# Patient Record
Sex: FEMALE | Race: BLACK OR AFRICAN AMERICAN | Employment: FULL TIME | URBAN - METROPOLITAN AREA
[De-identification: names, ages, dates, MRNs, and addresses within clinical notes are randomized per-mention and may not be internally consistent; named-entity substitution may affect disease eponyms.]

---

## 2017-04-14 ENCOUNTER — MYC REFILL (OUTPATIENT)
Dept: NEUROLOGY | Facility: CLINIC | Age: 22
End: 2017-04-14

## 2017-04-14 RX ORDER — FOLIC ACID 1 MG/1
1 TABLET ORAL 2 TIMES DAILY
Qty: 60 TABLET | Refills: 3 | OUTPATIENT
Start: 2017-04-14

## 2017-04-14 RX ORDER — LEVETIRACETAM 1000 MG/1
TABLET ORAL
Qty: 60 TABLET | Refills: 3 | OUTPATIENT
Start: 2017-04-14

## 2017-04-14 NOTE — TELEPHONE ENCOUNTER
Message from [employer]:  Original authorizing provider: MD Flori Gomezlidia Manjeet would like a refill of the following medications:  folic acid (FOLVITE) 1 MG tablet [Christopher Abarca MD]  levETIRAcetam 1000 MG TABS [Christopher Abarca MD]    Preferred pharmacy: [id number] - [place], [place] - [address] AT [facility]    Comment:

## 2017-04-14 NOTE — TELEPHONE ENCOUNTER
Patient has not been seen by this provider or others in the same practice since 4/1/2014 (over 3 years).  Refills denied as patient is no longer under this provider care

## 2017-06-07 ENCOUNTER — COMMUNICATION - HEALTHEAST (OUTPATIENT)
Dept: SCHEDULING | Facility: CLINIC | Age: 22
End: 2017-06-07

## 2017-11-16 NOTE — TELEPHONE ENCOUNTER
APPT INFO    Date /Time: 11/21/17, 8:30am    Reason for Appt: Seizures    Ref Provider/Clinic: Self    Are there internal records? If yes, list: [facility]   Patient Contact (Y/N) & Call Details: No, referred    Action:      OUTSIDE RECORDS CHECKLIST     CLINIC NAME COMMENTS REC (x) IMG (x)

## 2017-11-21 ENCOUNTER — PRE VISIT (OUTPATIENT)
Dept: NEUROLOGY | Facility: CLINIC | Age: 22
End: 2017-11-21

## 2017-11-21 ENCOUNTER — OFFICE VISIT (OUTPATIENT)
Dept: NEUROLOGY | Facility: CLINIC | Age: 22
End: 2017-11-21

## 2017-11-21 VITALS — HEART RATE: 64 BPM | DIASTOLIC BLOOD PRESSURE: 70 MMHG | SYSTOLIC BLOOD PRESSURE: 122 MMHG | HEIGHT: 66 IN

## 2017-11-21 DIAGNOSIS — F40.240 CLAUSTROPHOBIA: ICD-10-CM

## 2017-11-21 DIAGNOSIS — G40.319 INTRACTABLE IDIOPATHIC GENERALIZED EPILEPSY (H): Primary | ICD-10-CM

## 2017-11-21 DIAGNOSIS — R41.3 MEMORY LOSS: ICD-10-CM

## 2017-11-21 RX ORDER — DIAZEPAM 5 MG
TABLET ORAL
Qty: 1 TABLET | Refills: 0 | Status: SHIPPED | OUTPATIENT
Start: 2017-11-21

## 2017-11-21 ASSESSMENT — ENCOUNTER SYMPTOMS
NIGHT SWEATS: 1
LEG PAIN: 1
WEIGHT LOSS: 0
DYSPNEA ON EXERTION: 1
EXERCISE INTOLERANCE: 1
INSOMNIA: 1
TINGLING: 0
DIZZINESS: 1
NERVOUS/ANXIOUS: 1
SPEECH CHANGE: 1
COUGH: 1
WHEEZING: 1
DECREASED LIBIDO: 0
EYE WATERING: 1
POLYDIPSIA: 0
PANIC: 1
DECREASED CONCENTRATION: 1
HYPOTENSION: 0
HOT FLASHES: 0
HYPERTENSION: 0
ALTERED TEMPERATURE REGULATION: 1
TREMORS: 0
DOUBLE VISION: 0
LOSS OF CONSCIOUSNESS: 1
HEMOPTYSIS: 0
SHORTNESS OF BREATH: 1
ORTHOPNEA: 1
DEPRESSION: 1
PARALYSIS: 0
DECREASED APPETITE: 1
HEADACHES: 1
COUGH DISTURBING SLEEP: 1
SWOLLEN GLANDS: 0
CHILLS: 0
NUMBNESS: 0
HALLUCINATIONS: 1
SLEEP DISTURBANCES DUE TO BREATHING: 1
SYNCOPE: 1
PALPITATIONS: 0
SPUTUM PRODUCTION: 1
FEVER: 0
EYE PAIN: 0
SNORES LOUDLY: 1
MEMORY LOSS: 1
EYE REDNESS: 0
WEAKNESS: 1
INCREASED ENERGY: 1
BRUISES/BLEEDS EASILY: 0
SEIZURES: 1
POSTURAL DYSPNEA: 1
WEIGHT GAIN: 1
FATIGUE: 0
LIGHT-HEADEDNESS: 1
POLYPHAGIA: 0
EYE IRRITATION: 1

## 2017-11-21 ASSESSMENT — PAIN SCALES - GENERAL: PAINLEVEL: NO PAIN (0)
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
## 2017-11-21 NOTE — MR AVS SNAPSHOT
After Visit Summary   11/21/2017    Amrita Burroughs    MRN: [id number]           Patient Information     Date Of Birth          1995        Visit Information        Provider Department      11/21/2017 8:30 AM Chad Cox MD [facility] Neurology        Today's Diagnoses     Intractable idiopathic generalized epilepsy (H)    -  1    Memory loss        Claustrophobia           Follow-ups after your visit        Follow-up notes from your care team     Return in about 3 months (around 2/21/2018).      Your next 10 appointments already scheduled     Jan 16, 2018 12:00 PM CST   MR BRAIN W/O & W CONTRAST with [id number]   [facility]    [address] [address]   [phone]           Take your medicines as usual, unless your doctor tells you not to. Bring a list of your current medicines to your exam (including vitamins, minerals and over-the-counter drugs).  You will be given intravenous contrast for this exam. To prepare:   The day before your exam, drink extra fluids at least six 8-ounce glasses (unless your doctor tells you to restrict your fluids).   Have a blood test (creatinine test) within 30 days of your exam. Go to your clinic or Diagnostic Imaging Department for this test.  The MRI machine uses a strong magnet. Please wear clothes without metal (snaps, zippers). A sweatsuit works well, or we may give you a hospital gown.  Please remove any body piercings and hair extensions before you arrive. You will also remove watches, jewelry, hairpins, wallets, dentures, partial dental plates and hearing aids. You may wear contact lenses, and you may be able to wear your rings. We have a safe place to keep your personal items, but it is safer to leave them at home.   **IMPORTANT** THE INSTRUCTIONS BELOW ARE ONLY FOR THOSE PATIENTS WHO HAVE BEEN TOLD THEY WILL RECEIVE SEDATION OR GENERAL ANESTHESIA DURING THEIR MRI PROCEDURE:  IF YOU  WILL RECEIVE SEDATION (take medicine to help you relax during your exam):   You must get the medicine from your doctor before you arrive. Bring the medicine to the exam. Do not take it at home.   Arrive one hour early. Bring someone who can take you home after the test. Your medicine will make you sleepy. After the exam, you may not drive, take a bus or take a taxi by yourself.   No eating 8 hours before your exam. You may have clear liquids up until 4 hours before your exam. (Clear liquids include water, clear tea, black coffee and fruit juice without pulp.)  IF YOU WILL RECEIVE ANESTHESIA (be asleep for your exam):   Arrive 1 1/2 hours early. Bring someone who can take you home after the test. You may not drive, take a bus or take a taxi by yourself.   No eating 8 hours before your exam. You may have clear liquids up until 4 hours before your exam. (Clear liquids include water, clear tea, black coffee and fruit juice without pulp.)  Please call the Imaging Department at your exam site with any questions.            Jan 24, 2018  8:30 AM CST   EEG with [facility] EEG 2   [facility] ([facility])    [address]  [facility] [address]   [phone]            Jan 24, 2018 12:30 PM CST   New Patient Visit with Librado Mcdonough, PhD [facility] ([facility])    [address]  [facility] [address]   [phone]            Feb 20, 2018  3:30 PM CST   (Arrive by 3:15 PM)   Return Seizure with Chad Cox MD   [facility] Neurology ([facility])    [address]  [address]rd [address]  [place] [facility] [id number] [phone]              Future tests that were ordered for you today     Open Future Orders        Priority Expected Expires Ordered    Neuropsychological testing Routine 11/22/2017 11/21/2018 11/21/2017    EEG video monitoring Routine 11/22/2017 11/21/2018 11/21/2017    MRI Brain w & w/o contrast  "Routine 11/22/2017 11/21/2018 11/21/2017            Who to contact     Please call your clinic at [phone] to:    Ask questions about your health    Make or cancel appointments    Discuss your medicines    Learn about your test results    Speak to your doctor   If you have compliments or concerns about an experience at your clinic, or if you wish to file a complaint, please contact [facility] Patient Relations at [phone] or email us at [email or web address]         Additional Information About Your Visit        [employer] Yourself Information     [employer]a gives you secure access to your electronic health record. If you see a primary care provider, you can also send messages to your care team and make appointments. If you have questions, please call your primary care clinic.  If you do not have a primary care provider, please call [phone] and they will assist you.      Y[employer]a is an electronic gateway that provides easy, online access to your medical records. With [employer], you can request a clinic appointment, read your test results, renew a prescription or communicate with your care team.     To access your existing account, please contact your [facility] or call [phone] for assistance.        Care EveryWhere ID     This is your Care EveryWhere ID. This could be used by other organizations to access your [place] medical records  [id number]        Your Vitals Were     Pulse Height                64 1.676 m (5' 6\")           Blood Pressure from Last 3 Encounters:   11/21/17 122/70   01/08/16 133/78   04/01/14 108/63    Weight from Last 3 Encounters:   01/08/16 114.7 kg (252 lb 12.8 oz)   04/01/14 105.7 kg (233 lb)   03/17/14 105.2 kg (232 lb)                 Today's Medication Changes          These changes are accurate as of: 11/21/17  9:58 AM.  If you have any questions, ask your nurse or doctor.               Start taking these medicines.  "       Dose/Directions    diazepam 5 MG tablet   Commonly known as:  VALIUM   Used for:  Claustrophobia   Started by:  Chad Cox MD        Take 1 tablet (5 mg) about 30 minutes before MRI scan.   Quantity:  1 tablet   Refills:  0            Where to get your medicines      Some of these will need a paper prescription and others can be bought over the counter.  Ask your nurse if you have questions.     Bring a paper prescription for each of these medications     diazepam 5 MG tablet                Primary Care Provider Office Phone # Fax #    [facility] [phone] [address] [address]        Equal Access to Services     [facility]: Hadii héctor estevez hadasho Sobayron, waaxda luqadaha, qaybta kaalmada adeboris, arti castelan . So [facility] [phone].    ATENCIÓN: Si habla español, tiene a [place] disposición servicios gratuitos de asistencia lingüística. [facility] [phone].    We comply with applicable federal civil rights laws and [place] laws. We do not discriminate on the basis of race, color, national origin, age, disability, sex, sexual orientation, or gender identity.            Thank you!     Thank you for choosing [facility] NEUROLOGY  for your care. Our goal is always to provide you with excellent care. Hearing back from our patients is one way we can continue to improve our services. Please take a few minutes to complete the written survey that you may receive in the mail after your visit with us. Thank you!             Your Updated Medication List - Protect others around you: Learn how to safely use, store and throw away your medicines at www.disposemymeds.org.          This list is accurate as of: 11/21/17  9:58 AM.  Always use your most recent med list.                   Brand Name Dispense Instructions for use Diagnosis    Albuterol Sulfate 108 (90 BASE) MCG/ACT Aepb           diazepam 5 MG tablet    VALIUM    1 tablet     Take 1 tablet (5 mg) about 30 minutes before MRI scan.    Claustrophobia       LATUDA PO      Take 20 mg by mouth        medical cannabis liquid      (This is NOT a prescription, and does not certify that the patient has a qualifying medical condition for medical cannabis.  The purpose of this order is  to document that the patient reports taking medical cannabis.)        TRAZODONE HCL PO      Take 50 mg by mouth
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
## 2017-12-06 ENCOUNTER — COMMUNICATION - HEALTHEAST (OUTPATIENT)
Dept: SCHEDULING | Facility: CLINIC | Age: 22
End: 2017-12-06

## 2017-12-17 ENCOUNTER — HEALTH MAINTENANCE LETTER (OUTPATIENT)
Age: 22
End: 2017-12-17

## 2018-06-06 ENCOUNTER — COMMUNICATION - HEALTHEAST (OUTPATIENT)
Dept: SCHEDULING | Facility: CLINIC | Age: 23
End: 2018-06-06

## 2018-06-13 ENCOUNTER — OFFICE VISIT - HEALTHEAST (OUTPATIENT)
Dept: FAMILY MEDICINE | Facility: CLINIC | Age: 23
End: 2018-06-13

## 2018-06-13 ENCOUNTER — COMMUNICATION - HEALTHEAST (OUTPATIENT)
Dept: INTERNAL MEDICINE | Facility: CLINIC | Age: 23
End: 2018-06-13

## 2018-06-13 DIAGNOSIS — R07.9 CHEST PAIN: ICD-10-CM

## 2018-06-13 DIAGNOSIS — R10.11 RUQ ABDOMINAL PAIN: ICD-10-CM

## 2018-06-13 LAB
ALBUMIN SERPL-MCNC: 3.1 G/DL (ref 3.5–5)
ALBUMIN UR-MCNC: ABNORMAL MG/DL
ALP SERPL-CCNC: 88 U/L (ref 45–120)
ALT SERPL W P-5'-P-CCNC: 31 U/L (ref 0–45)
ANION GAP SERPL CALCULATED.3IONS-SCNC: 10 MMOL/L (ref 5–18)
APPEARANCE UR: CLEAR
AST SERPL W P-5'-P-CCNC: 20 U/L (ref 0–40)
BACTERIA #/AREA URNS HPF: ABNORMAL HPF
BASOPHILS # BLD AUTO: 0 THOU/UL (ref 0–0.2)
BASOPHILS NFR BLD AUTO: 1 % (ref 0–2)
BILIRUB SERPL-MCNC: 0.5 MG/DL (ref 0–1)
BILIRUB UR QL STRIP: ABNORMAL
BUN SERPL-MCNC: 10 MG/DL (ref 8–22)
CALCIUM SERPL-MCNC: 9.1 MG/DL (ref 8.5–10.5)
CHLORIDE BLD-SCNC: 110 MMOL/L (ref 98–107)
CO2 SERPL-SCNC: 20 MMOL/L (ref 22–31)
COLOR UR AUTO: YELLOW
CREAT SERPL-MCNC: 0.77 MG/DL (ref 0.6–1.1)
D DIMER PPP FEU-MCNC: 0.41 FEU UG/ML
EOSINOPHIL # BLD AUTO: 0.2 THOU/UL (ref 0–0.4)
EOSINOPHIL NFR BLD AUTO: 3 % (ref 0–6)
ERYTHROCYTE [DISTWIDTH] IN BLOOD BY AUTOMATED COUNT: 14.6 % (ref 11–14.5)
GFR SERPL CREATININE-BSD FRML MDRD: >60 ML/MIN/1.73M2
GLUCOSE BLD-MCNC: 84 MG/DL (ref 70–125)
GLUCOSE UR STRIP-MCNC: NEGATIVE MG/DL
HCT VFR BLD AUTO: 34.6 % (ref 35–47)
HGB BLD-MCNC: 11.2 G/DL (ref 12–16)
HGB UR QL STRIP: ABNORMAL
KETONES UR STRIP-MCNC: ABNORMAL MG/DL
LEUKOCYTE ESTERASE UR QL STRIP: NEGATIVE
LIPASE SERPL-CCNC: 39 U/L (ref 0–52)
LYMPHOCYTES # BLD AUTO: 2.8 THOU/UL (ref 0.8–4.4)
LYMPHOCYTES NFR BLD AUTO: 42 % (ref 20–40)
MCH RBC QN AUTO: 27 PG (ref 27–34)
MCHC RBC AUTO-ENTMCNC: 32.4 G/DL (ref 32–36)
MCV RBC AUTO: 83 FL (ref 80–100)
MONOCYTES # BLD AUTO: 0.4 THOU/UL (ref 0–0.9)
MONOCYTES NFR BLD AUTO: 6 % (ref 2–10)
MUCOUS THREADS #/AREA URNS LPF: ABNORMAL LPF
NEUTROPHILS # BLD AUTO: 3.1 THOU/UL (ref 2–7.7)
NEUTROPHILS NFR BLD AUTO: 48 % (ref 50–70)
NITRATE UR QL: NEGATIVE
PH UR STRIP: 5.5 [PH] (ref 5–8)
PLATELET # BLD AUTO: 374 THOU/UL (ref 140–440)
PMV BLD AUTO: 7.7 FL (ref 7–10)
POTASSIUM BLD-SCNC: 4.5 MMOL/L (ref 3.5–5)
PROT SERPL-MCNC: 6.5 G/DL (ref 6–8)
RBC # BLD AUTO: 4.16 MILL/UL (ref 3.8–5.4)
RBC #/AREA URNS AUTO: ABNORMAL HPF
SODIUM SERPL-SCNC: 140 MMOL/L (ref 136–145)
SP GR UR STRIP: >=1.03 (ref 1–1.03)
SQUAMOUS #/AREA URNS AUTO: ABNORMAL LPF
UROBILINOGEN UR STRIP-ACNC: ABNORMAL
WBC #/AREA URNS AUTO: ABNORMAL HPF
WBC: 6.5 THOU/UL (ref 4–11)

## 2018-06-15 ENCOUNTER — COMMUNICATION - HEALTHEAST (OUTPATIENT)
Dept: INTERNAL MEDICINE | Facility: CLINIC | Age: 23
End: 2018-06-15

## 2018-06-20 ENCOUNTER — COMMUNICATION - HEALTHEAST (OUTPATIENT)
Dept: SCHEDULING | Facility: CLINIC | Age: 23
End: 2018-06-20

## 2018-06-20 DIAGNOSIS — R51.9 HEADACHE: ICD-10-CM

## 2018-09-27 ENCOUNTER — OFFICE VISIT - HEALTHEAST (OUTPATIENT)
Dept: FAMILY MEDICINE | Facility: CLINIC | Age: 23
End: 2018-09-27

## 2018-09-27 DIAGNOSIS — G40.909 EPILEPSY (H): ICD-10-CM

## 2018-09-27 DIAGNOSIS — G43.909 MIGRAINE: ICD-10-CM

## 2018-09-27 DIAGNOSIS — Z79.899 MEDICAL MARIJUANA USE: ICD-10-CM

## 2018-10-25 ENCOUNTER — COMMUNICATION - HEALTHEAST (OUTPATIENT)
Dept: CARE COORDINATION | Facility: CLINIC | Age: 23
End: 2018-10-25

## 2018-12-11 ENCOUNTER — COMMUNICATION - HEALTHEAST (OUTPATIENT)
Dept: INTERNAL MEDICINE | Facility: CLINIC | Age: 23
End: 2018-12-11

## 2019-01-06 ENCOUNTER — HOSPITAL ENCOUNTER (EMERGENCY)
Facility: CLINIC | Age: 24
Discharge: HOME | End: 2019-01-06
Attending: EMERGENCY MEDICINE | Admitting: EMERGENCY MEDICINE
Payer: COMMERCIAL

## 2019-01-06 VITALS
SYSTOLIC BLOOD PRESSURE: 131 MMHG | RESPIRATION RATE: 18 BRPM | DIASTOLIC BLOOD PRESSURE: 68 MMHG | HEART RATE: 77 BPM | TEMPERATURE: 98.6 F | OXYGEN SATURATION: 99 %

## 2019-01-06 DIAGNOSIS — Z00.00 EVALUATION BY MEDICAL SERVICE REQUIRED: ICD-10-CM

## 2019-01-06 PROCEDURE — 99282 EMERGENCY DEPT VISIT SF MDM: CPT

## 2019-01-06 ASSESSMENT — ENCOUNTER SYMPTOMS: SEIZURES: 0

## 2019-01-06 NOTE — ED AVS SNAPSHOT
[facility] Emergency Department  [address]  [facility] [address]  Phone:  [phone]  Fax:  [phone]                                    Amrita Burroughs   MRN: [id number]    Department:  [facility] Emergency Department   Date of Visit:  1/6/2019           After Visit Summary Signature Page    I have received my discharge instructions, and my questions have been answered. I have discussed any challenges I see with this plan with the nurse or doctor.    ..........................................................................................................................................  Patient/Patient Representative Signature      ..........................................................................................................................................  Patient Representative Print Name and Relationship to Patient    ..................................................               ................................................  Date                                   Time    ..........................................................................................................................................  Reviewed by Signature/Title    ...................................................              ..............................................  Date                                               Time          22EPIC Rev 08/18

## 2019-01-06 NOTE — DISCHARGE INSTRUCTIONS
Please follow-up with your primary care doctor as needed.    Please return to the emergency department as needed for new or worsening symptoms including thoughts of self-harm or harming others, auditory or visual hallucinations.
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
## 2019-01-06 NOTE — ED AVS SNAPSHOT
[facility] EMERGENCY DEPARTMENT: [phone]                                              INTERAGENCY TRANSFER FORM - LAB / IMAGING / EKG / EMG RESULTS   2019                   Hospital Admission Date: 2019  KATHIE MCCRARY   : 1995  Sex: Female         Attending Provider:  Efraín Estrella MD    Allergies:  Toradol [Ketorolac Tromethamine]    Infection:  None   Service:  EMERGENCY ME    Ht:  --   Wt:  --   Admission Wt:  --    BMI:  --   BSA:  --            Patient PCP Information     Provider PCP Type    [facility]      Unresulted Labs (24h ago, onward)    None      Encounter-Level Documents:    There are no encounter-level documents.     Order-Level Documents:    There are no order-level documents.

## 2019-10-08 ENCOUNTER — HOSPITAL ENCOUNTER (EMERGENCY)
Facility: CLINIC | Age: 24
Discharge: HOME | End: 2019-10-08
Attending: EMERGENCY MEDICINE | Admitting: EMERGENCY MEDICINE
Payer: COMMERCIAL

## 2019-10-08 VITALS
BODY MASS INDEX: 40.18 KG/M2 | HEIGHT: 66 IN | DIASTOLIC BLOOD PRESSURE: 87 MMHG | HEART RATE: 116 BPM | WEIGHT: 250 LBS | OXYGEN SATURATION: 100 % | SYSTOLIC BLOOD PRESSURE: 128 MMHG | RESPIRATION RATE: 22 BRPM | TEMPERATURE: 97.6 F

## 2019-10-08 DIAGNOSIS — E80.6 HYPERBILIRUBINEMIA: ICD-10-CM

## 2019-10-08 DIAGNOSIS — J45.901 EXACERBATION OF ASTHMA, UNSPECIFIED ASTHMA SEVERITY, UNSPECIFIED WHETHER PERSISTENT: ICD-10-CM

## 2019-10-08 DIAGNOSIS — E87.6 HYPOKALEMIA: ICD-10-CM

## 2019-10-08 DIAGNOSIS — R11.10 VOMITING AND DIARRHEA: ICD-10-CM

## 2019-10-08 DIAGNOSIS — R19.7 VOMITING AND DIARRHEA: ICD-10-CM

## 2019-10-08 LAB
ALBUMIN SERPL-MCNC: 4 G/DL (ref 3.4–5)
ALP SERPL-CCNC: 108 U/L (ref 40–150)
ALT SERPL W P-5'-P-CCNC: 22 U/L (ref 0–50)
ANION GAP SERPL CALCULATED.3IONS-SCNC: 5 MMOL/L (ref 3–14)
AST SERPL W P-5'-P-CCNC: 17 U/L (ref 0–45)
B-HCG FREE SERPL-ACNC: <5 IU/L
BILIRUB SERPL-MCNC: 1.6 MG/DL (ref 0.2–1.3)
BUN SERPL-MCNC: 10 MG/DL (ref 7–30)
CALCIUM SERPL-MCNC: 9.3 MG/DL (ref 8.5–10.1)
CHLORIDE SERPL-SCNC: 110 MMOL/L (ref 94–109)
CO2 SERPL-SCNC: 22 MMOL/L (ref 20–32)
CREAT SERPL-MCNC: 0.79 MG/DL (ref 0.52–1.04)
GFR SERPL CREATININE-BSD FRML MDRD: >90 ML/MIN/{1.73_M2}
GLUCOSE SERPL-MCNC: 93 MG/DL (ref 70–99)
LIPASE SERPL-CCNC: 90 U/L (ref 73–393)
POTASSIUM SERPL-SCNC: 3.3 MMOL/L (ref 3.4–5.3)
PROT SERPL-MCNC: 8 G/DL (ref 6.8–8.8)
SODIUM SERPL-SCNC: 137 MMOL/L (ref 133–144)

## 2019-10-08 PROCEDURE — 80053 COMPREHEN METABOLIC PANEL: CPT | Performed by: EMERGENCY MEDICINE

## 2019-10-08 PROCEDURE — 84702 CHORIONIC GONADOTROPIN TEST: CPT

## 2019-10-08 PROCEDURE — 99284 EMERGENCY DEPT VISIT MOD MDM: CPT | Mod: 25

## 2019-10-08 PROCEDURE — 96374 THER/PROPH/DIAG INJ IV PUSH: CPT

## 2019-10-08 PROCEDURE — 94640 AIRWAY INHALATION TREATMENT: CPT

## 2019-10-08 PROCEDURE — 96361 HYDRATE IV INFUSION ADD-ON: CPT

## 2019-10-08 PROCEDURE — 83690 ASSAY OF LIPASE: CPT | Performed by: EMERGENCY MEDICINE

## 2019-10-08 PROCEDURE — 25000125 ZZHC RX 250: Performed by: EMERGENCY MEDICINE

## 2019-10-08 PROCEDURE — 25000128 H RX IP 250 OP 636: Performed by: EMERGENCY MEDICINE

## 2019-10-08 PROCEDURE — 25000131 ZZH RX MED GY IP 250 OP 636 PS 637: Performed by: EMERGENCY MEDICINE

## 2019-10-08 RX ORDER — PREDNISONE 20 MG/1
40 TABLET ORAL DAILY
Qty: 8 TABLET | Refills: 0 | Status: SHIPPED | OUTPATIENT
Start: 2019-10-08 | End: 2019-10-12

## 2019-10-08 RX ORDER — IPRATROPIUM BROMIDE AND ALBUTEROL SULFATE 2.5; .5 MG/3ML; MG/3ML
3 SOLUTION RESPIRATORY (INHALATION)
Status: COMPLETED | OUTPATIENT
Start: 2019-10-08 | End: 2019-10-08

## 2019-10-08 RX ORDER — PREDNISONE 20 MG/1
40 TABLET ORAL ONCE
Status: COMPLETED | OUTPATIENT
Start: 2019-10-08 | End: 2019-10-08

## 2019-10-08 RX ORDER — IPRATROPIUM BROMIDE AND ALBUTEROL SULFATE 2.5; .5 MG/3ML; MG/3ML
3 SOLUTION RESPIRATORY (INHALATION) ONCE
Status: COMPLETED | OUTPATIENT
Start: 2019-10-08 | End: 2019-10-08

## 2019-10-08 RX ORDER — ONDANSETRON 4 MG/1
4 TABLET, ORALLY DISINTEGRATING ORAL EVERY 6 HOURS PRN
Qty: 15 TABLET | Refills: 0 | Status: SHIPPED | OUTPATIENT
Start: 2019-10-08

## 2019-10-08 RX ORDER — ONDANSETRON 2 MG/ML
4 INJECTION INTRAMUSCULAR; INTRAVENOUS
Status: DISCONTINUED | OUTPATIENT
Start: 2019-10-08 | End: 2019-10-08 | Stop reason: HOSPADM

## 2019-10-08 RX ORDER — SODIUM CHLORIDE 9 MG/ML
1000 INJECTION, SOLUTION INTRAVENOUS CONTINUOUS
Status: DISCONTINUED | OUTPATIENT
Start: 2019-10-08 | End: 2019-10-08 | Stop reason: HOSPADM

## 2019-10-08 RX ORDER — ALBUTEROL SULFATE 90 UG/1
2 AEROSOL, METERED RESPIRATORY (INHALATION) EVERY 4 HOURS PRN
Qty: 1 INHALER | Refills: 0 | Status: SHIPPED | OUTPATIENT
Start: 2019-10-08

## 2019-10-08 RX ADMIN — IPRATROPIUM BROMIDE AND ALBUTEROL SULFATE 3 ML: .5; 3 SOLUTION RESPIRATORY (INHALATION) at 11:03

## 2019-10-08 RX ADMIN — IPRATROPIUM BROMIDE AND ALBUTEROL SULFATE 3 ML: .5; 3 SOLUTION RESPIRATORY (INHALATION) at 09:29

## 2019-10-08 RX ADMIN — IPRATROPIUM BROMIDE AND ALBUTEROL SULFATE 3 ML: .5; 3 SOLUTION RESPIRATORY (INHALATION) at 10:45

## 2019-10-08 RX ADMIN — PREDNISONE 40 MG: 20 TABLET ORAL at 09:29

## 2019-10-08 RX ADMIN — SODIUM CHLORIDE 1000 ML: 9 INJECTION, SOLUTION INTRAVENOUS at 09:38

## 2019-10-08 RX ADMIN — ONDANSETRON 4 MG: 2 INJECTION INTRAMUSCULAR; INTRAVENOUS at 09:39

## 2019-10-08 ASSESSMENT — MIFFLIN-ST. JEOR: SCORE: 1890.74

## 2019-10-08 NOTE — ED AVS SNAPSHOT
Emergency Department  [address] [place] [id number]  Phone:  [phone]  Fax:  [phone]                                    Amrita Burroughs   MRN: [id number]    Department:   Emergency Department   Date of Visit:  10/8/2019           After Visit Summary Signature Page    I have received my discharge instructions, and my questions have been answered. I have discussed any challenges I see with this plan with the nurse or doctor.    ..........................................................................................................................................  Patient/Patient Representative Signature      ..........................................................................................................................................  Patient Representative Print Name and Relationship to Patient    ..................................................               ................................................  Date                                   Time    ..........................................................................................................................................  Reviewed by Signature/Title    ...................................................              ..............................................  Date                                               Time          22EPIC Rev 08/18

## 2019-10-08 NOTE — ED PROVIDER NOTES
"  History     Chief Complaint:  Shortness of Breath    The history is provided by the patient.      Amrita Burroughs is a 24 year old female who presents with shortness of breath. The patient reports chest tightness due to wheezing and trouble breathing, dry cough, runny nose, vomiting, and diarrhea for several days. She reports some wheezing. She has a history of asthma but does not have an inhaler anymore to use. She denies smoking cigarettes but does smoke marijuana. Her last menstrual period was in September and she denies concern for pregnancy.  She has never been in the ICU or intubated for her breathing problems.    Allergies:  Methylprednisolone  Toradol     Medications:    Albuterol  Valium  Latuda  Trazodone     Past Medical History:    Memory loss  Claustrophobia  Intractable idiopathic generalized epilepsy  Asthma    Past Surgical History:    History reviewed. No pertinent past surgical history.    Family History:    History reviewed. No pertinent family history.    Social History:  Patient is single  Tobacco Use: No  Alcohol Use: No  PCP: Physician No Ref-Primary     Review of Systems   All other systems reviewed and are negative.    Physical Exam   First Vitals:  Patient Vitals for the past 24 hrs:   BP Temp Temp src Pulse Heart Rate Resp SpO2 Height Weight   10/08/19 1104 135/70 -- -- 122 -- -- 100 % -- --   10/08/19 1010 -- -- -- -- -- -- 100 % -- --   10/08/19 1000 116/69 -- -- 100 -- -- 97 % -- --   10/08/19 0949 126/69 -- -- -- 92 22 100 % -- --   10/08/19 0911 121/61 97.6  F (36.4  C) Temporal -- 103 22 100 % 1.676 m (5' 6\") 113.4 kg (250 lb)     Physical Exam  General: Woman sitting upright in room 9  HENT: mucous membranes moist  CV: tachycardic rate, regular rhythm, no lower extremity edema, no murmur audible  Resp: Initially with moderate expiratory wheezing and slightly diminished breath sounds throughout, no wheezing, fairly frequent dry cough observed, speaks in full phrases  GI: abdomen " soft and nontender, no guarding  MSK: no bony tenderness  Skin: appropriately warm and dry  Neuro: alert, clear speech, oriented  Psych: slightly anxious, cooperative      Emergency Department Course     Laboratory:  ISTAT HCG (0943): <5.0  CMP: Potassium 3.3 low, Chloride 110 high, Bilirubin 1.6 high, o/w WNL. (Creatinine 0.79)  Lipase: 90    Interventions:  0929: Deltasone 40mg PO  0929: Duoneb 3mL Nebulization  0938: NS 1L IV  0939: Zofran 4mg IV  1045: Duoneb 3mL Nebulization   1103: Duoneb 3mL Nebulization    Emergency Department Course:  9:14 AM Nursing notes and vitals reviewed.  I performed an exam of the patient as documented above.     I performed electronic chart review in EP[facility].  I reviewed available electronic records in Care Everywhere.  The patient was placed on continuous cardiac and pulse ox monitoring.    10:32 AM I rechecked on and updated the patient. She has only had one of the two nebulization treatments I ordered so far and she states it has helped some.     11:12 AM I rechecked on the patient. She is feeling improved wheezing has decreased air movement has noticeably increased.  She is not hypoxic.  After finishing her third nebulization.      11:16 AM Findings and plan explained to the patient. Patient discharged home with instructions regarding supportive care, medications, and reasons to return. The importance of close follow-up was reviewed.     Impression & Plan      Medical Decision Making:  I think she is likely experiencing a viral syndrome that has provoked an asthma exacerbation as well as her nonbloody vomiting and diarrhea.  Her abdomen is completely soft and I do not think she requires emergent imaging.  Extremely low suspicion for any surgical etiology.  Alternate diagnoses including cardiac arrhythmia, pulmonary embolism, pneumonia, pneumothorax, and pulmonary edema were considered among many others.  She was treated with bronchodilators with improvement.  She is not in  respiratory distress at the time of discharge and I think that she is appropriate for a trial of outpatient management.  I note that she did not have any bronchodilators available to her at home, and I am hopeful that with initiation of a burst of steroids as well as a prescription for albuterol MDI she will improve over the next few days.  She is tolerating oral intake here.  She was advised to follow-up through clinic in the next few days if not steadily improving, and return here in the unexpected event of acute worsening.  She also benefit from follow-up through clinic within a week for recheck of her laboratory values including hypokalemia and hyperbilirubinemia of unclear clinical significance.    Diagnosis:    ICD-10-CM    1. Exacerbation of asthma, unspecified asthma severity, unspecified whether persistent J45.901    2. Vomiting and diarrhea R11.10     R19.7    3. Hypokalemia E87.6    4. Hyperbilirubinemia E80.6        Disposition:  discharged to home    Discharge Medications:  New Prescriptions    ALBUTEROL (PROAIR HFA) 108 (90 BASE) MCG/ACT INHALER    Inhale 2 puffs into the lungs every 4 hours as needed for shortness of breath / dyspnea    ONDANSETRON (ZOFRAN ODT) 4 MG ODT TAB    Take 1 tablet (4 mg) by mouth every 6 hours as needed for nausea    PREDNISONE (DELTASONE) 20 MG TABLET    Take 2 tablets (40 mg) by mouth daily for 4 days     I, Bradley Aasen, am serving as a scribe on 10/8/2019 at 9:14 AM to personally document services performed by Davin Vilchis MD based on my observations and the provider's statements to me.     This record was created at least in part using electronic voice recognition software, so please excuse any typographical errors.         Davin Vilchis MD  10/08/19 3298

## 2019-10-08 NOTE — LETTER
October 8, 2019      To Whom It May Concern:      Amrita Burroughs was seen in our Emergency Department today, 10/08/19.  I expect her condition to improve over the next 2 days.  She may return to work when improved.    Sincerely,        KIMBERLEY Webb

## 2019-10-08 NOTE — ED TRIAGE NOTES
C/o cough for the past couple days, hx of asthma and feeling increasing sob and wheezing. Hasn't been using her nebulizer. .

## 2020-03-02 ENCOUNTER — HEALTH MAINTENANCE LETTER (OUTPATIENT)
Age: 25
End: 2020-03-02

## 2020-12-20 ENCOUNTER — HEALTH MAINTENANCE LETTER (OUTPATIENT)
Age: 25
End: 2020-12-20

## 2021-04-24 ENCOUNTER — HEALTH MAINTENANCE LETTER (OUTPATIENT)
Age: 26
End: 2021-04-24

## 2021-06-01 VITALS — BODY MASS INDEX: 40.32 KG/M2 | WEIGHT: 253.6 LBS

## 2021-06-02 VITALS — BODY MASS INDEX: 41.38 KG/M2 | WEIGHT: 256.4 LBS

## 2021-06-18 NOTE — PROGRESS NOTES
Assessment/Plan:      Amrita Burroughs is a 22 y.o. female here to establish care and for the follow-up of    1. Chest pain  Recent chest CTA to rule out for PE was reported to be suboptimal    Plan  - D-dimer, Quantitative-was negative. No need to repeat the scan   - XR Chest 2 Views  We will follow-up pending the results of the study to manage accordingly     2. RUQ abdominal pain    - Comprehensive Metabolic Panel  - HM1(CBC and Differential)  - Urinalysis-UC if Indicated  - Lipase  - US Abdomen Limited  We will follow-up pending the results of the study to manage accordingly             Subjective:    Patient ID:   Amrita Burroughs is a 22 y.o. female here to establish care.   She was recently hospitalized on and off during this past 2 weeks in [place], for food poisioning, stomach and chest pains.   Her chest symptoms continue to feel about the same with no change in severity with or without the deep breathing.  She has intermittent sharp pains running down her mid back, I has noted some wheezing.  In view of her recent CTA exam to rule out for PE, said to be a suboptimal exam and noted to have trace pericardial and right pleural effusions.  She knows to be having pain in the right upper quadrant and a daily headache which has not been tolerating Tylenol since the ibuprofen is been causing her to have stomach irritation.  She also states that has stopped taking the Prilosec  .      Review of Systems  Constitutional: negative  Eyes: negative  ENT and face: negative  Respiratory: See HPI, chest pains and some shortness of breath, wheezing noted  CV: Denies palpitations  GI: Right upper quadrant pain, denies nausea vomiting constipation or diarrhea  : neg   Skin: negative   Musculoskeletal:negative  Neuro/ psych: Recent head CT scan for evaluation of headache was negative any intracranial pathology     The following patient's history were reviewed and updated as appropriate:   She  has a past medical history  of Asthma; Epilepsy (H); and Medical marijuana use.  She  does not have any pertinent problems on file.  She  has a past surgical history that includes No past surgeries.  Her family history includes Breast cancer in her maternal aunt and maternal grandmother; Depression in her father and mother; Diabetes in her father and mother; Heart disease in her father and mother; Hyperlipidemia in her father and mother; Hypertension in her father and mother; Stroke in her mother.  She  reports that she has never smoked. She has never used smokeless tobacco. She reports that she uses illicit drugs, including Marijuana. She reports that she does not drink alcohol..      Outpatient Encounter Prescriptions as of 6/13/2018   Medication Sig Dispense Refill     acetaminophen (TYLENOL) 325 MG tablet Take 1 tablet (325 mg total) by mouth every 6 (six) hours as needed for pain. 15 tablet 0     albuterol (ACCUNEB) 1.25 mg/3 mL nebulizer solution Take 3 mL (1.25 mg total) by nebulization every 6 (six) hours as needed for wheezing or shortness of breath. 1 vial 0     albuterol (PROAIR HFA;PROVENTIL HFA;VENTOLIN HFA) 90 mcg/actuation inhaler Inhale 2 puffs every 6 (six) hours as needed for wheezing or shortness of breath. 1 Inhaler 0     beclomethasone (QVAR) 40 mcg/actuation inhaler Inhale 2 puffs 2 (two) times a day. 1 Inhaler 1     EPINEPHrine (EPIPEN/ADRENACLICK) 0.3 mg/0.3 mL injection Inject 0.3 mL (0.3 mg total) as directed as needed for anaphylaxis. Inject into thigh. 2 Pre-filled Pen Syringe 0     norelgestromin-ethinyl estradiol (XULANE) 150-35 mcg/24 hr Place 1 patch on the skin once a week.       omeprazole (PRILOSEC) 40 MG capsule TK 1 C PO QD AC  3     metroNIDAZOLE (METROGEL) 0.75 % vaginal gel Insert 1 application into the vagina 2 (two) times a week. INSERT 1 APPLICATORFUL VAGINALLY QHS X 5 DAYS THEN TWICE WEEKLY X 3 MONTHS UTD  1     traZODone (DESYREL) 50 MG tablet Take 50 mg by mouth at bedtime as needed for sleep.        No facility-administered encounter medications on file as of 6/13/2018.          Objective:   /72 (Patient Site: Right Arm, Patient Position: Sitting, Cuff Size: Adult Large)  Pulse 85  Temp 98  F (36.7  C) (Oral)   Wt (!) 253 lb 9.6 oz (115 kg)  LMP 06/05/2018  SpO2 97%  BMI 40.32 kg/m2      Physical Exam  General Appearance:    Alert, well hydrated, no distress,    Eyes:    PERRL, conjunctiva/corneas clear,    Throat:   Lips, mucosa, and tongue normal; teeth and gums normal   Neck:   Supple, symmetrical, trachea midline, no adenopathy;        thyroid:  No enlargement/tenderness/nodules; no carotid    bruit or JVD   Lungs:     Clear to auscultation bilaterally, respirations unlabored   Heart:    Regular rate and rhythm, S1 and S2 normal, no murmur, rub   or gallop   Abdomen:     Soft, palpable tenderness to the right upper quadrant , normal bowel sounds, no rebound or guarding, no masses, no organomegaly   Extremities:   Extremities normal, atraumatic, no cyanosis or edema   Skin:   Skin color, texture, turgor normal, no rashes or lesions

## 2021-06-20 NOTE — PROGRESS NOTES
Assessment/Plan:        Amrita Burroughs is a 23 y.o. female here to discuss a form needed to help with her Housing. She is basing this on her history of mental health and seizure disorder, which is being managed by [facility].  In view of her records and history, she is advised to bring the form to the treating provider/ facility who is treating and monitoring her condition.   She is agreeable to the plan of care.       Total time spent with patient was 15  minutes with >50% of time spent in face-to-face counseling regarding the above plan.                   Subjective:    Patient ID:   Amrita Burroughs is a 23 y.o. female comes in to discuss a form needed to help with housing. She has a history of mental health and seizure disorder, managed by [facility].   She has no other concerns today .    The following patient's history were reviewed and updated as appropriate:   She  has a past medical history of Asthma; Epilepsy (H); Medical marijuana use; and Migraine..      Outpatient Encounter Prescriptions as of 9/27/2018   Medication Sig Dispense Refill     acetaminophen (TYLENOL) 325 MG tablet Take 1 tablet (325 mg total) by mouth every 6 (six) hours as needed for pain. 15 tablet 0     albuterol (ACCUNEB) 1.25 mg/3 mL nebulizer solution Take 3 mL (1.25 mg total) by nebulization every 6 (six) hours as needed for wheezing or shortness of breath. 1 vial 0     albuterol (PROAIR HFA;PROVENTIL HFA;VENTOLIN HFA) 90 mcg/actuation inhaler Inhale 2 puffs every 6 (six) hours as needed for wheezing or shortness of breath. 1 Inhaler 0     beclomethasone (QVAR) 40 mcg/actuation inhaler Inhale 2 puffs 2 (two) times a day. 1 Inhaler 1     EPINEPHrine (EPIPEN/ADRENACLICK) 0.3 mg/0.3 mL injection Inject 0.3 mL (0.3 mg total) as directed as needed for anaphylaxis. Inject into thigh. 2 Pre-filled Pen Syringe 0     metoclopramide (REGLAN) 10 MG tablet Take 1 tablet (10 mg total) by mouth every 6 (six) hours. 30 tablet 0      norelgestromin-ethinyl estradiol (XULANE) 150-35 mcg/24 hr Place 1 patch on the skin once a week.       prochlorperazine (COMPAZINE) 10 MG tablet Take 1 tablet (10 mg total) by mouth every 6 (six) hours as needed for nausea (Nausea). 20 tablet 0     SUMAtriptan (IMITREX) 50 MG tablet Take  mg at onset. May repeat once after 2 hrs as needed. Max dose of 200 mg/24 hours. 20 tablet 0     tiZANidine (ZANAFLEX) 4 MG tablet Take 1 tablet (4 mg total) by mouth every 8 (eight) hours as needed. 30 tablet 0     No facility-administered encounter medications on file as of 9/27/2018.          Objective:   /66 (Patient Site: Right Arm, Patient Position: Sitting, Cuff Size: Adult Large)  Pulse 69  Temp 98.4  F (36.9  C) (Oral)   Wt (!) 256 lb 6.4 oz (116.3 kg)  SpO2 98%  BMI 41.38 kg/m2      Physical Exam  General: NAD.   Skin: no rash

## 2021-06-21 NOTE — PROGRESS NOTES
TCM DISCHARGE FOLLOW UP CALL    Discharge Date:  10/24/2018  Reason for hospital stay (discharge diagnosis)::  Asthma exacerbation  Are you feeling better, the same or worse since your discharge?:  Patient is feeling better (wheezing less)  Do you feel like you have a plan in the event of a health emergency?: Yes (Pt is aware of nurse triage)    As part of your discharge plan, were  home care services ordered for you?: No    Did you receive any new medications, or was there a change to your medications?: Yes    Are you taking those medications, or do you have any established regiment?:  Pt is doing nebs three times a day, using albuterol two times a day and continues on prednisone taper.  Do you have any follow up visits scheduled with your PCP or Specialist?:  No  I'm glad to hear you're doing well and we want you to continue to do well. Your PCP would like to see you for a follow-up visit. Can we help set that up for your today?: Yes    (RN) Patient was assisted in making appointment and/or given number to [facility].  If there are immediate concerns, In Basket messge route to the PCP with a summary of concern.:  RN assisted patient in scheduling appt

## 2021-10-03 ENCOUNTER — HEALTH MAINTENANCE LETTER (OUTPATIENT)
Age: 26
End: 2021-10-03

## 2022-05-15 ENCOUNTER — HEALTH MAINTENANCE LETTER (OUTPATIENT)
Age: 27
End: 2022-05-15

## 2022-09-10 ENCOUNTER — HEALTH MAINTENANCE LETTER (OUTPATIENT)
Age: 27
End: 2022-09-10

## 2023-06-03 ENCOUNTER — HEALTH MAINTENANCE LETTER (OUTPATIENT)
Age: 28
End: 2023-06-03